# Patient Record
(demographics unavailable — no encounter records)

---

## 2025-06-10 NOTE — PHYSICAL EXAM
[FreeTextEntry3] : brown papules on the chest and scalp pink papules with surrounding erythema on the bilateral lower legs

## 2025-06-10 NOTE — ASSESSMENT
[FreeTextEntry1] : 1) Bee sting reaction, legs  - No evidence of anaphylaxis or another type 1 hypersensitivity based on history and exam - Reviewed risks (as well as mitigation strategies for adverse drug events as applicable), benefits, and alternatives of therapy  - start Mometasone 0.1% ointment bid to AA prn itchy rash. use for 2-3 weeks and then stop. SED   2) Nevi - Counseled about clinically benign lesions - Discussed regular OTC sunscreen use, SPF 30 or higher   RTC prn

## 2025-06-10 NOTE — HISTORY OF PRESENT ILLNESS
[FreeTextEntry1] : lesion, bites  [de-identified] : # Concerning skin lesions Location: scalp, chest, back Duration: years  Symptoms (itch, pain, bleeding, growth etc): none   Pt father c/o bee stings. location: feet duration: several days symptoms: pain